# Patient Record
Sex: MALE | Race: OTHER | NOT HISPANIC OR LATINO
[De-identification: names, ages, dates, MRNs, and addresses within clinical notes are randomized per-mention and may not be internally consistent; named-entity substitution may affect disease eponyms.]

---

## 2019-01-31 ENCOUNTER — TRANSCRIPTION ENCOUNTER (OUTPATIENT)
Age: 49
End: 2019-01-31

## 2019-01-31 NOTE — ASU PATIENT PROFILE, ADULT - PM[facility]
Blunt trauma eye  left eye 2015  Hypertension    Ischemic heart disease    Myocardial infarction  2010

## 2019-01-31 NOTE — ASU PATIENT PROFILE, ADULT - P[facility]
History of eye surgery  os in 2015 due to trauma  Status post angioplasty with stent  3 coronary stents

## 2019-02-01 ENCOUNTER — OUTPATIENT (OUTPATIENT)
Dept: OUTPATIENT SERVICES | Facility: HOSPITAL | Age: 49
LOS: 1 days | End: 2019-02-01
Payer: MEDICAID

## 2019-02-01 VITALS
OXYGEN SATURATION: 98 % | WEIGHT: 231.04 LBS | DIASTOLIC BLOOD PRESSURE: 80 MMHG | HEIGHT: 74 IN | RESPIRATION RATE: 19 BRPM | HEART RATE: 61 BPM | TEMPERATURE: 98 F | SYSTOLIC BLOOD PRESSURE: 128 MMHG

## 2019-02-01 VITALS
RESPIRATION RATE: 13 BRPM | SYSTOLIC BLOOD PRESSURE: 111 MMHG | OXYGEN SATURATION: 98 % | HEART RATE: 62 BPM | DIASTOLIC BLOOD PRESSURE: 61 MMHG

## 2019-02-01 DIAGNOSIS — H33.022 RETINAL DETACHMENT WITH MULTIPLE BREAKS, LEFT EYE: ICD-10-CM

## 2019-02-01 DIAGNOSIS — Z95.820 PERIPHERAL VASCULAR ANGIOPLASTY STATUS WITH IMPLANTS AND GRAFTS: Chronic | ICD-10-CM

## 2019-02-01 DIAGNOSIS — Z98.890 OTHER SPECIFIED POSTPROCEDURAL STATES: Chronic | ICD-10-CM

## 2019-02-01 PROCEDURE — 93005 ELECTROCARDIOGRAM TRACING: CPT

## 2019-02-01 PROCEDURE — 93010 ELECTROCARDIOGRAM REPORT: CPT

## 2019-02-01 PROCEDURE — C1889: CPT

## 2019-02-01 PROCEDURE — C1784: CPT

## 2019-02-01 PROCEDURE — 67108 REPAIR DETACHED RETINA: CPT | Mod: LT

## 2019-02-01 NOTE — ASU DISCHARGE PLAN (ADULT/PEDIATRIC). - SPECIAL INSTRUCTIONS
face down during day, sleep on right side with nose turned towards floor.  great neck office tomorrow.

## 2020-07-18 PROBLEM — I25.9 CHRONIC ISCHEMIC HEART DISEASE, UNSPECIFIED: Chronic | Status: ACTIVE | Noted: 2019-02-01

## 2020-07-18 PROBLEM — I21.9 ACUTE MYOCARDIAL INFARCTION, UNSPECIFIED: Chronic | Status: ACTIVE | Noted: 2019-02-01

## 2020-07-18 PROBLEM — I10 ESSENTIAL (PRIMARY) HYPERTENSION: Chronic | Status: ACTIVE | Noted: 2019-02-01

## 2020-07-18 PROBLEM — S05.90XA UNSPECIFIED INJURY OF UNSPECIFIED EYE AND ORBIT, INITIAL ENCOUNTER: Chronic | Status: ACTIVE | Noted: 2019-02-01

## 2020-07-30 ENCOUNTER — APPOINTMENT (OUTPATIENT)
Dept: NEUROLOGY | Facility: CLINIC | Age: 50
End: 2020-07-30

## 2022-09-26 NOTE — ASU PREOP CHECKLIST - SELECT TESTS ORDERED
"RN called patient to inform her of providers message below. Patient stated her BP was only high this AM due to the \"hassle of calling the clinic and getting transferred so many times.\" She stated BP was at 136/83 at 2:45pm, and then she just checked it a few minutes ago and it was 123/75. RN reviewed red flag symptoms with patient and when to see emergency care. Patient agreed and understood.     RN asked if patient was still having chest pain and patient stated \"she was not having chest pain today it was just GERD.\"    Routing to PCP to advise if he would like patient to follow up in clinic at all?    EYAL GonzalezN, RN     " EKG

## 2024-10-16 ENCOUNTER — APPOINTMENT (OUTPATIENT)
Dept: ORTHOPEDIC SURGERY | Facility: CLINIC | Age: 54
End: 2024-10-16